# Patient Record
Sex: MALE | Race: OTHER | NOT HISPANIC OR LATINO | URBAN - METROPOLITAN AREA
[De-identification: names, ages, dates, MRNs, and addresses within clinical notes are randomized per-mention and may not be internally consistent; named-entity substitution may affect disease eponyms.]

---

## 2017-04-14 ENCOUNTER — EMERGENCY (EMERGENCY)
Facility: HOSPITAL | Age: 57
LOS: 1 days | Discharge: HOME | End: 2017-04-14
Attending: EMERGENCY MEDICINE | Admitting: EMERGENCY MEDICINE
Payer: MEDICAID

## 2017-04-14 VITALS
SYSTOLIC BLOOD PRESSURE: 152 MMHG | OXYGEN SATURATION: 97 % | DIASTOLIC BLOOD PRESSURE: 95 MMHG | TEMPERATURE: 98 F | HEART RATE: 77 BPM | RESPIRATION RATE: 20 BRPM

## 2017-04-14 VITALS
WEIGHT: 220.02 LBS | TEMPERATURE: 98 F | HEIGHT: 71 IN | OXYGEN SATURATION: 98 % | SYSTOLIC BLOOD PRESSURE: 154 MMHG | DIASTOLIC BLOOD PRESSURE: 102 MMHG | RESPIRATION RATE: 24 BRPM | HEART RATE: 70 BPM

## 2017-04-14 DIAGNOSIS — J45.901 UNSPECIFIED ASTHMA WITH (ACUTE) EXACERBATION: ICD-10-CM

## 2017-04-14 DIAGNOSIS — R05 COUGH: ICD-10-CM

## 2017-04-14 DIAGNOSIS — R06.02 SHORTNESS OF BREATH: ICD-10-CM

## 2017-04-14 DIAGNOSIS — R06.00 DYSPNEA, UNSPECIFIED: ICD-10-CM

## 2017-04-14 PROCEDURE — 99284 EMERGENCY DEPT VISIT MOD MDM: CPT

## 2017-04-14 PROCEDURE — 94640 AIRWAY INHALATION TREATMENT: CPT

## 2017-04-14 PROCEDURE — 99284 EMERGENCY DEPT VISIT MOD MDM: CPT | Mod: 25

## 2017-04-14 RX ORDER — IPRATROPIUM/ALBUTEROL SULFATE 18-103MCG
3 AEROSOL WITH ADAPTER (GRAM) INHALATION
Qty: 0 | Refills: 0 | Status: COMPLETED | OUTPATIENT
Start: 2017-04-14 | End: 2017-04-14

## 2017-04-14 RX ORDER — ALBUTEROL 90 UG/1
2 AEROSOL, METERED ORAL
Qty: 1 | Refills: 0 | OUTPATIENT
Start: 2017-04-14 | End: 2017-05-14

## 2017-04-14 RX ADMIN — Medication 3 MILLILITER(S): at 10:00

## 2017-04-14 RX ADMIN — Medication 3 MILLILITER(S): at 10:15

## 2017-04-14 RX ADMIN — Medication 3 MILLILITER(S): at 09:49

## 2017-04-14 RX ADMIN — Medication 60 MILLIGRAM(S): at 09:40

## 2017-04-14 NOTE — ED PROVIDER NOTE - ATTENDING CONTRIBUTION TO CARE
57 y/o M pmhx asthma presenting with asthma exacerbation, ran out of ventolin inhaler which was not helpful last few days; recent abx for chest congestion. no fevers or chills; nontoxic appearing. PE remarkable for significant diffuse wheeze. Will provide steroids, nebs and reassess.

## 2017-04-14 NOTE — ED PROVIDER NOTE - PHYSICAL EXAMINATION
Well Appearing, Nontoxic, NAD; Symm Facies, PERRL, EOMI, MMM, posterior pharynx clear; No JVD/Bruits or stridor;  RRR w/o m/g/r;   LUNGS with significant wheeze, inspiratory and expiratory diffusely. No rhonchi or rales;  Abd soft, nt/nd, +bs, no guarding/rebound, no RUQ tender;  No CVAT;  No edema/calf tender;  No rash, pallor;  Neuro intact, AOX3, CN II-XII intact, Normal speech, normal strength/sensation/gait

## 2017-04-14 NOTE — ED PROVIDER NOTE - OBJECTIVE STATEMENT
57 y/o M pmhx asthma presenting with asthma exacerbation worsening over last few weeks. Pt reports that he usually uses his Ventolin inhaler 2-3x per day; however, he had 'bad chest congestion for which he was prescribed augmentin' x2 weeks ago, and since that time he feels his asthma has gotten significantly worse. Pt reports he was using his Ventolin a great deal more, and ran out; was not helpful to him the last 4-5 days. Reports dry cough now associated with asthma, and noting himself wheezing significantly more. Denies any fevers, chills, productive cough, nausea, vomiting, recent travel, chest pain, lower extremity edema.

## 2017-04-14 NOTE — ED PROVIDER NOTE - NS ED ROS FT
Constitutional: No fever or chills  Eyes: No visual changes, eye pain or redness  HEENT: No throat pain, ear pain, nasal pain. No nose bleeding.  CV: No chest pain or lower extremity edema  Resp: +SOB and cough, dry  GI: No abd pain. No nausea or vomiting. No diarrhea. No constipation.   : No dysuria, hematuria.   MSK: No musculoskeletal pain  Skin: No rash  Neuro: No headache. No numbness or tingling. No weakness.

## 2017-05-06 ENCOUNTER — EMERGENCY (EMERGENCY)
Facility: HOSPITAL | Age: 57
LOS: 1 days | Discharge: HOME | End: 2017-05-06
Attending: EMERGENCY MEDICINE | Admitting: EMERGENCY MEDICINE
Payer: MEDICAID

## 2017-05-06 VITALS
DIASTOLIC BLOOD PRESSURE: 72 MMHG | HEART RATE: 82 BPM | TEMPERATURE: 98 F | SYSTOLIC BLOOD PRESSURE: 113 MMHG | OXYGEN SATURATION: 97 % | RESPIRATION RATE: 18 BRPM

## 2017-05-06 VITALS
RESPIRATION RATE: 20 BRPM | TEMPERATURE: 98 F | OXYGEN SATURATION: 96 % | SYSTOLIC BLOOD PRESSURE: 134 MMHG | HEART RATE: 78 BPM | DIASTOLIC BLOOD PRESSURE: 90 MMHG

## 2017-05-06 DIAGNOSIS — J45.901 UNSPECIFIED ASTHMA WITH (ACUTE) EXACERBATION: ICD-10-CM

## 2017-05-06 DIAGNOSIS — R06.02 SHORTNESS OF BREATH: ICD-10-CM

## 2017-05-06 DIAGNOSIS — Z87.891 PERSONAL HISTORY OF NICOTINE DEPENDENCE: ICD-10-CM

## 2017-05-06 DIAGNOSIS — E78.5 HYPERLIPIDEMIA, UNSPECIFIED: ICD-10-CM

## 2017-05-06 LAB
ALBUMIN SERPL ELPH-MCNC: 4.1 G/DL — SIGNIFICANT CHANGE UP (ref 3.3–5)
ALP SERPL-CCNC: 53 U/L — SIGNIFICANT CHANGE UP (ref 40–120)
ALT FLD-CCNC: 24 U/L RC — SIGNIFICANT CHANGE UP (ref 10–45)
ANION GAP SERPL CALC-SCNC: 11 MMOL/L — SIGNIFICANT CHANGE UP (ref 5–17)
AST SERPL-CCNC: 17 U/L — SIGNIFICANT CHANGE UP (ref 10–40)
BASOPHILS # BLD AUTO: 0 K/UL — SIGNIFICANT CHANGE UP (ref 0–0.2)
BASOPHILS NFR BLD AUTO: 0.5 % — SIGNIFICANT CHANGE UP (ref 0–2)
BILIRUB SERPL-MCNC: 1.5 MG/DL — HIGH (ref 0.2–1.2)
BUN SERPL-MCNC: 12 MG/DL — SIGNIFICANT CHANGE UP (ref 7–23)
CALCIUM SERPL-MCNC: 10.3 MG/DL — SIGNIFICANT CHANGE UP (ref 8.4–10.5)
CHLORIDE SERPL-SCNC: 106 MMOL/L — SIGNIFICANT CHANGE UP (ref 96–108)
CK MB CFR SERPL CALC: 1 NG/ML — SIGNIFICANT CHANGE UP (ref 0–6.7)
CK SERPL-CCNC: 61 U/L — SIGNIFICANT CHANGE UP (ref 30–200)
CO2 SERPL-SCNC: 24 MMOL/L — SIGNIFICANT CHANGE UP (ref 22–31)
CREAT SERPL-MCNC: 0.84 MG/DL — SIGNIFICANT CHANGE UP (ref 0.5–1.3)
EOSINOPHIL # BLD AUTO: 0.5 K/UL — SIGNIFICANT CHANGE UP (ref 0–0.5)
EOSINOPHIL NFR BLD AUTO: 8.7 % — HIGH (ref 0–6)
GAS PNL BLDV: SIGNIFICANT CHANGE UP
GLUCOSE SERPL-MCNC: 123 MG/DL — HIGH (ref 70–99)
HCT VFR BLD CALC: 43.6 % — SIGNIFICANT CHANGE UP (ref 39–50)
HGB BLD-MCNC: 15.3 G/DL — SIGNIFICANT CHANGE UP (ref 13–17)
LYMPHOCYTES # BLD AUTO: 2 K/UL — SIGNIFICANT CHANGE UP (ref 1–3.3)
LYMPHOCYTES # BLD AUTO: 35 % — SIGNIFICANT CHANGE UP (ref 13–44)
MCHC RBC-ENTMCNC: 31.2 PG — SIGNIFICANT CHANGE UP (ref 27–34)
MCHC RBC-ENTMCNC: 35.1 GM/DL — SIGNIFICANT CHANGE UP (ref 32–36)
MCV RBC AUTO: 88.9 FL — SIGNIFICANT CHANGE UP (ref 80–100)
MONOCYTES # BLD AUTO: 0.3 K/UL — SIGNIFICANT CHANGE UP (ref 0–0.9)
MONOCYTES NFR BLD AUTO: 5.8 % — SIGNIFICANT CHANGE UP (ref 2–14)
NEUTROPHILS # BLD AUTO: 2.8 K/UL — SIGNIFICANT CHANGE UP (ref 1.8–7.4)
NEUTROPHILS NFR BLD AUTO: 49.9 % — SIGNIFICANT CHANGE UP (ref 43–77)
NT-PROBNP SERPL-SCNC: 54 PG/ML — SIGNIFICANT CHANGE UP (ref 0–300)
PLATELET # BLD AUTO: 232 K/UL — SIGNIFICANT CHANGE UP (ref 150–400)
POTASSIUM SERPL-MCNC: 4 MMOL/L — SIGNIFICANT CHANGE UP (ref 3.5–5.3)
POTASSIUM SERPL-SCNC: 4 MMOL/L — SIGNIFICANT CHANGE UP (ref 3.5–5.3)
PROT SERPL-MCNC: 7.1 G/DL — SIGNIFICANT CHANGE UP (ref 6–8.3)
RBC # BLD: 4.9 M/UL — SIGNIFICANT CHANGE UP (ref 4.2–5.8)
RBC # FLD: 12.1 % — SIGNIFICANT CHANGE UP (ref 10.3–14.5)
SODIUM SERPL-SCNC: 141 MMOL/L — SIGNIFICANT CHANGE UP (ref 135–145)
TROPONIN T SERPL-MCNC: <0.01 NG/ML — SIGNIFICANT CHANGE UP (ref 0–0.06)
WBC # BLD: 5.6 K/UL — SIGNIFICANT CHANGE UP (ref 3.8–10.5)
WBC # FLD AUTO: 5.6 K/UL — SIGNIFICANT CHANGE UP (ref 3.8–10.5)

## 2017-05-06 PROCEDURE — 82553 CREATINE MB FRACTION: CPT

## 2017-05-06 PROCEDURE — 82330 ASSAY OF CALCIUM: CPT

## 2017-05-06 PROCEDURE — 71045 X-RAY EXAM CHEST 1 VIEW: CPT

## 2017-05-06 PROCEDURE — 84295 ASSAY OF SERUM SODIUM: CPT

## 2017-05-06 PROCEDURE — 93010 ELECTROCARDIOGRAM REPORT: CPT

## 2017-05-06 PROCEDURE — 83880 ASSAY OF NATRIURETIC PEPTIDE: CPT

## 2017-05-06 PROCEDURE — 82803 BLOOD GASES ANY COMBINATION: CPT

## 2017-05-06 PROCEDURE — 84132 ASSAY OF SERUM POTASSIUM: CPT

## 2017-05-06 PROCEDURE — 71010: CPT | Mod: 26

## 2017-05-06 PROCEDURE — 83605 ASSAY OF LACTIC ACID: CPT

## 2017-05-06 PROCEDURE — 93005 ELECTROCARDIOGRAM TRACING: CPT

## 2017-05-06 PROCEDURE — 85014 HEMATOCRIT: CPT

## 2017-05-06 PROCEDURE — 99285 EMERGENCY DEPT VISIT HI MDM: CPT | Mod: 25

## 2017-05-06 PROCEDURE — 80053 COMPREHEN METABOLIC PANEL: CPT

## 2017-05-06 PROCEDURE — 85027 COMPLETE CBC AUTOMATED: CPT

## 2017-05-06 PROCEDURE — 99284 EMERGENCY DEPT VISIT MOD MDM: CPT | Mod: 25

## 2017-05-06 PROCEDURE — 82550 ASSAY OF CK (CPK): CPT

## 2017-05-06 PROCEDURE — 96374 THER/PROPH/DIAG INJ IV PUSH: CPT

## 2017-05-06 PROCEDURE — 82435 ASSAY OF BLOOD CHLORIDE: CPT

## 2017-05-06 PROCEDURE — 82947 ASSAY GLUCOSE BLOOD QUANT: CPT

## 2017-05-06 PROCEDURE — 94640 AIRWAY INHALATION TREATMENT: CPT

## 2017-05-06 PROCEDURE — 84484 ASSAY OF TROPONIN QUANT: CPT

## 2017-05-06 RX ORDER — ALBUTEROL 90 UG/1
0 AEROSOL, METERED ORAL
Qty: 0 | Refills: 0 | COMMUNITY

## 2017-05-06 RX ORDER — IPRATROPIUM/ALBUTEROL SULFATE 18-103MCG
3 AEROSOL WITH ADAPTER (GRAM) INHALATION
Qty: 0 | Refills: 0 | Status: COMPLETED | OUTPATIENT
Start: 2017-05-06 | End: 2017-05-06

## 2017-05-06 RX ORDER — ALBUTEROL 90 UG/1
2 AEROSOL, METERED ORAL
Qty: 1 | Refills: 0 | OUTPATIENT
Start: 2017-05-06 | End: 2017-06-05

## 2017-05-06 RX ORDER — ALBUTEROL 90 UG/1
3 AEROSOL, METERED ORAL
Qty: 30 | Refills: 0 | OUTPATIENT
Start: 2017-05-06 | End: 2017-06-05

## 2017-05-06 RX ORDER — IPRATROPIUM/ALBUTEROL SULFATE 18-103MCG
3 AEROSOL WITH ADAPTER (GRAM) INHALATION EVERY 6 HOURS
Qty: 0 | Refills: 0 | Status: DISCONTINUED | OUTPATIENT
Start: 2017-05-06 | End: 2017-05-10

## 2017-05-06 RX ADMIN — Medication 3 MILLILITER(S): at 12:36

## 2017-05-06 RX ADMIN — Medication 125 MILLIGRAM(S): at 12:04

## 2017-05-06 RX ADMIN — Medication 3 MILLILITER(S): at 12:49

## 2017-05-06 RX ADMIN — Medication 3 MILLILITER(S): at 12:04

## 2017-05-06 NOTE — ED PROVIDER NOTE - OBJECTIVE STATEMENT
56M with history of asthma/COPD, nephrolithiasis and BPH, presenting with 3 days or persistent shortness of breath, nonproductive cough and wheezing. Patient says that he recently lost insurance coverage, and ran out of Ventolin 3 days ago. Since he's had the above symptoms. Patient currently lives with his sister, who smokes and has a dog. He has been having asthma exacerbations frequently due to the pollen and other environmental triggers. No fevers/chills/night sweats. No chest pain. No abdominal pain/nausea/vomiting/diarrhea. No hematuria/dysuria.

## 2017-05-06 NOTE — ED PROVIDER NOTE - ATTENDING CONTRIBUTION TO CARE
56m pmhx asthma/COPD, kidney stones, BPH p/w cough/SOB/wheeze. started ~1 wk ago, constant, mild a/w dry cough and wheeze. recently lost insurance coverage, and ran out of albuterol 3 days ago. states last exacerbation last month, d/c'd home with steroids; had never been admitted. denies fever/chills, ENT complaints, CP, nausea/vomiting, pedal edema. states sx c/w past attacks. former smoker. on PE, VSS, in mild distress, mild exp wheeze, RRR, abdo soft, non-TTP, no pedal edema. will give duonebs x3, soluemdrol, CXR. cardiac panel, EKG

## 2017-05-06 NOTE — ED ADULT NURSE NOTE - OBJECTIVE STATEMENT
56 yr old male PMHx asthma, BPH, and HLD presents here today with c/o wheezing and sob x 1 week. patient states he ran out of his albuterol and since his insurance is  he was not able to refill it. Also the change of season makes his asthma worse. States he usually takes his inhaler 7-8 times a day with relief. He is not on any other medications to control his asthma. reports dry cough, sob, and intermittent chest discomfort. Denies any fevers, chills, travel, leg pain, leg swelling, dizziness, nasal congestion, throat pain, or sick contacts. Lungs diffuse wheezing in all lung fields. safety and comfort maintained.

## 2017-05-31 ENCOUNTER — INPATIENT (INPATIENT)
Facility: HOSPITAL | Age: 57
LOS: 2 days | Discharge: HOME | DRG: 202 | End: 2017-06-03
Attending: INTERNAL MEDICINE | Admitting: INTERNAL MEDICINE
Payer: MEDICAID

## 2017-05-31 VITALS
SYSTOLIC BLOOD PRESSURE: 147 MMHG | OXYGEN SATURATION: 94 % | HEART RATE: 203 BPM | TEMPERATURE: 100 F | RESPIRATION RATE: 20 BRPM | DIASTOLIC BLOOD PRESSURE: 81 MMHG

## 2017-05-31 LAB — GAS PNL BLDV: SIGNIFICANT CHANGE UP

## 2017-05-31 PROCEDURE — 99291 CRITICAL CARE FIRST HOUR: CPT

## 2017-05-31 RX ORDER — CEFTRIAXONE 500 MG/1
1 INJECTION, POWDER, FOR SOLUTION INTRAMUSCULAR; INTRAVENOUS ONCE
Qty: 0 | Refills: 0 | Status: COMPLETED | OUTPATIENT
Start: 2017-05-31 | End: 2017-05-31

## 2017-05-31 RX ORDER — IPRATROPIUM/ALBUTEROL SULFATE 18-103MCG
3 AEROSOL WITH ADAPTER (GRAM) INHALATION ONCE
Qty: 0 | Refills: 0 | Status: COMPLETED | OUTPATIENT
Start: 2017-05-31 | End: 2017-05-31

## 2017-05-31 RX ORDER — SODIUM CHLORIDE 9 MG/ML
1000 INJECTION INTRAMUSCULAR; INTRAVENOUS; SUBCUTANEOUS ONCE
Qty: 0 | Refills: 0 | Status: COMPLETED | OUTPATIENT
Start: 2017-05-31 | End: 2017-05-31

## 2017-05-31 RX ORDER — ACETAMINOPHEN 500 MG
650 TABLET ORAL ONCE
Qty: 0 | Refills: 0 | Status: COMPLETED | OUTPATIENT
Start: 2017-05-31 | End: 2017-06-01

## 2017-05-31 RX ORDER — MAGNESIUM SULFATE 500 MG/ML
2 VIAL (ML) INJECTION ONCE
Qty: 0 | Refills: 0 | Status: COMPLETED | OUTPATIENT
Start: 2017-05-31 | End: 2017-05-31

## 2017-05-31 RX ORDER — AZITHROMYCIN 500 MG/1
500 TABLET, FILM COATED ORAL ONCE
Qty: 0 | Refills: 0 | Status: COMPLETED | OUTPATIENT
Start: 2017-05-31 | End: 2017-05-31

## 2017-05-31 RX ADMIN — Medication 3 MILLILITER(S): at 23:32

## 2017-05-31 RX ADMIN — SODIUM CHLORIDE 1000 MILLILITER(S): 9 INJECTION INTRAMUSCULAR; INTRAVENOUS; SUBCUTANEOUS at 23:49

## 2017-05-31 RX ADMIN — Medication 3 MILLILITER(S): at 23:49

## 2017-05-31 RX ADMIN — Medication 3 MILLILITER(S): at 23:40

## 2017-05-31 RX ADMIN — Medication 125 MILLIGRAM(S): at 23:49

## 2017-05-31 NOTE — ED PROVIDER NOTE - ATTENDING CONTRIBUTION TO CARE
I, Dr. Hal Sanabria, interviewed the patient and performed a physical exam and spoke to the resident about the plan of care for this patient.  Pt with cough, wheezing, chills, dyspnea, not improved after multiple nebs at home.  Appears in distress, wheezing, retractions, dry mm.

## 2017-05-31 NOTE — ED PROVIDER NOTE - CARE PLAN
Principal Discharge DX:	Asthma exacerbation  Secondary Diagnosis:	Hypoxemia requiring supplemental oxygen

## 2017-05-31 NOTE — ED PROVIDER NOTE - PHYSICAL EXAMINATION
Constitutional: Alert, in mod distress 2/2 dyspnea  Eyes: PERRLA EOMI  HEENT: MMM, oropharynx non-erythematous, no exudate  Head: Normocephalic atraumatic  Cardiac: RRR, S1/S2, no MRG  Resp: Increased WOB with accessory muscle use, diffuse rhonchi bilat  GI: Abd s/nt/nd, no rebound or guard  Neuro: CN2-12 intact  Skin: No rashes

## 2017-05-31 NOTE — ED PROVIDER NOTE - NS ED ROS FT
Constitutional: No fever or chills  Eyes: No visual changes  HEENT: Nasal congestion, sore throat  CV: No chest pain  Resp: Cough, sob, wheeze  GI: No abd pain, nause or vomiting  : No dysuria  MSK: No musculoskeletal pain  Skin: No rash  Neuro: No headache

## 2017-05-31 NOTE — ED PROVIDER NOTE - MEDICAL DECISION MAKING DETAILS
Dr. Sanabria Note: asthma exac with fever and hypoxemia, will req admission for failure of outpt for fluids, nebs, steroids, and possibly antibx

## 2017-05-31 NOTE — ED PROVIDER NOTE - OBJECTIVE STATEMENT
56M with pmh of asthma presents with cough, wheeze, sob, x a few days, worsening today. initially with sore throat, nasal congestion, cough productive of clear sputum. today with worsening wheeze. used nebulizer x 2, proventil inhaler "over 50 times" without sig improvement. subjective fever, chills. no n/v/d. no cp. no abd pain. no known sick contacts, no recent travel.

## 2017-06-01 DIAGNOSIS — J45.901 UNSPECIFIED ASTHMA WITH (ACUTE) EXACERBATION: ICD-10-CM

## 2017-06-01 LAB
ALBUMIN SERPL ELPH-MCNC: 4.8 G/DL — SIGNIFICANT CHANGE UP (ref 3.3–5)
ALP SERPL-CCNC: 63 U/L — SIGNIFICANT CHANGE UP (ref 40–120)
ALT FLD-CCNC: 18 U/L RC — SIGNIFICANT CHANGE UP (ref 10–45)
ANION GAP SERPL CALC-SCNC: 18 MMOL/L — HIGH (ref 5–17)
AST SERPL-CCNC: 17 U/L — SIGNIFICANT CHANGE UP (ref 10–40)
BASOPHILS # BLD AUTO: 0 K/UL — SIGNIFICANT CHANGE UP (ref 0–0.2)
BASOPHILS NFR BLD AUTO: 0.5 % — SIGNIFICANT CHANGE UP (ref 0–2)
BILIRUB SERPL-MCNC: 1.9 MG/DL — HIGH (ref 0.2–1.2)
BUN SERPL-MCNC: 6 MG/DL — LOW (ref 7–23)
CALCIUM SERPL-MCNC: 10.7 MG/DL — HIGH (ref 8.4–10.5)
CHLORIDE SERPL-SCNC: 101 MMOL/L — SIGNIFICANT CHANGE UP (ref 96–108)
CO2 SERPL-SCNC: 23 MMOL/L — SIGNIFICANT CHANGE UP (ref 22–31)
CREAT SERPL-MCNC: 0.99 MG/DL — SIGNIFICANT CHANGE UP (ref 0.5–1.3)
EOSINOPHIL # BLD AUTO: 0.3 K/UL — SIGNIFICANT CHANGE UP (ref 0–0.5)
EOSINOPHIL NFR BLD AUTO: 3.8 % — SIGNIFICANT CHANGE UP (ref 0–6)
GLUCOSE SERPL-MCNC: 118 MG/DL — HIGH (ref 70–99)
HCT VFR BLD CALC: 47 % — SIGNIFICANT CHANGE UP (ref 39–50)
HGB BLD-MCNC: 15.7 G/DL — SIGNIFICANT CHANGE UP (ref 13–17)
IGE SERPL-ACNC: 1264 IU/ML — HIGH (ref 0–100)
LYMPHOCYTES # BLD AUTO: 1.1 K/UL — SIGNIFICANT CHANGE UP (ref 1–3.3)
LYMPHOCYTES # BLD AUTO: 12 % — LOW (ref 13–44)
MCHC RBC-ENTMCNC: 30.3 PG — SIGNIFICANT CHANGE UP (ref 27–34)
MCHC RBC-ENTMCNC: 33.3 GM/DL — SIGNIFICANT CHANGE UP (ref 32–36)
MCV RBC AUTO: 90.9 FL — SIGNIFICANT CHANGE UP (ref 80–100)
MONOCYTES # BLD AUTO: 0.6 K/UL — SIGNIFICANT CHANGE UP (ref 0–0.9)
MONOCYTES NFR BLD AUTO: 7 % — SIGNIFICANT CHANGE UP (ref 2–14)
NEUTROPHILS # BLD AUTO: 6.9 K/UL — SIGNIFICANT CHANGE UP (ref 1.8–7.4)
NEUTROPHILS NFR BLD AUTO: 76.7 % — SIGNIFICANT CHANGE UP (ref 43–77)
PLATELET # BLD AUTO: 253 K/UL — SIGNIFICANT CHANGE UP (ref 150–400)
POTASSIUM SERPL-MCNC: 3.5 MMOL/L — SIGNIFICANT CHANGE UP (ref 3.5–5.3)
POTASSIUM SERPL-SCNC: 3.5 MMOL/L — SIGNIFICANT CHANGE UP (ref 3.5–5.3)
PROCALCITONIN SERPL-MCNC: <0.05 NG/ML — SIGNIFICANT CHANGE UP (ref 0–0.04)
PROT SERPL-MCNC: 8.2 G/DL — SIGNIFICANT CHANGE UP (ref 6–8.3)
RAPID RVP RESULT: DETECTED
RBC # BLD: 5.17 M/UL — SIGNIFICANT CHANGE UP (ref 4.2–5.8)
RBC # FLD: 12.1 % — SIGNIFICANT CHANGE UP (ref 10.3–14.5)
RV+EV RNA SPEC QL NAA+PROBE: DETECTED
SODIUM SERPL-SCNC: 142 MMOL/L — SIGNIFICANT CHANGE UP (ref 135–145)
WBC # BLD: 9 K/UL — SIGNIFICANT CHANGE UP (ref 3.8–10.5)
WBC # FLD AUTO: 9 K/UL — SIGNIFICANT CHANGE UP (ref 3.8–10.5)

## 2017-06-01 PROCEDURE — 99223 1ST HOSP IP/OBS HIGH 75: CPT

## 2017-06-01 PROCEDURE — 71020: CPT | Mod: 26

## 2017-06-01 PROCEDURE — 71250 CT THORAX DX C-: CPT | Mod: 26

## 2017-06-01 RX ORDER — CEFTRIAXONE 500 MG/1
INJECTION, POWDER, FOR SOLUTION INTRAMUSCULAR; INTRAVENOUS
Qty: 0 | Refills: 0 | Status: DISCONTINUED | OUTPATIENT
Start: 2017-06-01 | End: 2017-06-01

## 2017-06-01 RX ORDER — BUDESONIDE, MICRONIZED 100 %
0.5 POWDER (GRAM) MISCELLANEOUS EVERY 12 HOURS
Qty: 0 | Refills: 0 | Status: DISCONTINUED | OUTPATIENT
Start: 2017-06-01 | End: 2017-06-02

## 2017-06-01 RX ORDER — AZITHROMYCIN 500 MG/1
250 TABLET, FILM COATED ORAL DAILY
Qty: 0 | Refills: 0 | Status: DISCONTINUED | OUTPATIENT
Start: 2017-06-02 | End: 2017-06-03

## 2017-06-01 RX ORDER — IPRATROPIUM/ALBUTEROL SULFATE 18-103MCG
3 AEROSOL WITH ADAPTER (GRAM) INHALATION EVERY 4 HOURS
Qty: 0 | Refills: 0 | Status: DISCONTINUED | OUTPATIENT
Start: 2017-06-01 | End: 2017-06-03

## 2017-06-01 RX ORDER — ALBUTEROL 90 UG/1
2.5 AEROSOL, METERED ORAL ONCE
Qty: 0 | Refills: 0 | Status: COMPLETED | OUTPATIENT
Start: 2017-06-01 | End: 2017-06-01

## 2017-06-01 RX ORDER — HEPARIN SODIUM 5000 [USP'U]/ML
5000 INJECTION INTRAVENOUS; SUBCUTANEOUS EVERY 8 HOURS
Qty: 0 | Refills: 0 | Status: DISCONTINUED | OUTPATIENT
Start: 2017-06-01 | End: 2017-06-03

## 2017-06-01 RX ORDER — AZITHROMYCIN 500 MG/1
TABLET, FILM COATED ORAL
Qty: 0 | Refills: 0 | Status: DISCONTINUED | OUTPATIENT
Start: 2017-06-01 | End: 2017-06-01

## 2017-06-01 RX ORDER — AZITHROMYCIN 500 MG/1
500 TABLET, FILM COATED ORAL ONCE
Qty: 0 | Refills: 0 | Status: COMPLETED | OUTPATIENT
Start: 2017-06-01 | End: 2017-06-01

## 2017-06-01 RX ORDER — CEFTRIAXONE 500 MG/1
1 INJECTION, POWDER, FOR SOLUTION INTRAMUSCULAR; INTRAVENOUS ONCE
Qty: 0 | Refills: 0 | Status: COMPLETED | OUTPATIENT
Start: 2017-06-01 | End: 2017-06-01

## 2017-06-01 RX ADMIN — Medication 20 MILLIGRAM(S): at 11:37

## 2017-06-01 RX ADMIN — SODIUM CHLORIDE 1000 MILLILITER(S): 9 INJECTION INTRAMUSCULAR; INTRAVENOUS; SUBCUTANEOUS at 00:13

## 2017-06-01 RX ADMIN — Medication 100 GRAM(S): at 00:01

## 2017-06-01 RX ADMIN — Medication 3 MILLILITER(S): at 18:52

## 2017-06-01 RX ADMIN — HEPARIN SODIUM 5000 UNIT(S): 5000 INJECTION INTRAVENOUS; SUBCUTANEOUS at 15:06

## 2017-06-01 RX ADMIN — Medication 3 MILLILITER(S): at 15:02

## 2017-06-01 RX ADMIN — CEFTRIAXONE 100 GRAM(S): 500 INJECTION, POWDER, FOR SOLUTION INTRAMUSCULAR; INTRAVENOUS at 00:11

## 2017-06-01 RX ADMIN — Medication 3 MILLILITER(S): at 11:37

## 2017-06-01 RX ADMIN — Medication 650 MILLIGRAM(S): at 00:01

## 2017-06-01 RX ADMIN — AZITHROMYCIN 250 MILLIGRAM(S): 500 TABLET, FILM COATED ORAL at 01:02

## 2017-06-01 RX ADMIN — HEPARIN SODIUM 5000 UNIT(S): 5000 INJECTION INTRAVENOUS; SUBCUTANEOUS at 21:14

## 2017-06-01 RX ADMIN — Medication 20 MILLIGRAM(S): at 21:14

## 2017-06-01 RX ADMIN — Medication 0.5 MILLIGRAM(S): at 19:52

## 2017-06-01 RX ADMIN — ALBUTEROL 2.5 MILLIGRAM(S): 90 AEROSOL, METERED ORAL at 03:31

## 2017-06-01 RX ADMIN — Medication 3 MILLILITER(S): at 21:14

## 2017-06-01 NOTE — ED ADULT NURSE NOTE - OBJECTIVE STATEMENT
Pt c/o SOB since this morning.  Pt reports feeling like he had a sore throat with cold like symptoms since the weekend, this morning around 1030 began with dry cough, SOB and chills, progressively worsening throughout the day.  Pt arrives tachycardic, tachypneic to 30, spO2 93% on RA, audible wheezing, diaphoretic, conversive without difficulty, ambulatory, abd soft/nt/nd, skin intact, denies cp, ha, nvd, numbness/tingling, sick contacts, recent travel.  CCM in place, EKG performed and handed to attending, duoneb treatment initiated.

## 2017-06-01 NOTE — H&P ADULT. - ASSESSMENT
pt with above history p/w asthma exacerbation    - steroids, nebs, oxygen supplementation, pulm eval    Chest pressure - likely pleuritic given h/o smoking will check echo , cards eval, chest pain, pt is chest pain free at present

## 2017-06-01 NOTE — H&P ADULT. - HISTORY OF PRESENT ILLNESS
· HPI Objective Statement: 56M with pmh of asthma presents with cough, wheeze, sob, x a few days, worsening today. initially with sore throat, nasal congestion, cough productive of clear sputum. today with worsening wheeze. used nebulizer x 2, proventil inhaler "over 50 times" without sig improvement. subjective fever, chills. no n/v/d. no cp. no abd pain. no known sick contacts, no recent travel. 56M with pmh of asthma presents with cough, wheeze, sob, x a few days, worsening today. initially with sore throat, nasal congestion, cough productive of clear sputum. today with worsening wheeze. used nebulizer x 2, proventil inhaler  without sig improvement. subjective fever, chills. no n/v/d. no cp. no abd pain. no known sick contacts, no recent travel.

## 2017-06-02 LAB
ANION GAP SERPL CALC-SCNC: 16 MMOL/L — SIGNIFICANT CHANGE UP (ref 5–17)
BUN SERPL-MCNC: 16 MG/DL — SIGNIFICANT CHANGE UP (ref 7–23)
CALCIUM SERPL-MCNC: 10.3 MG/DL — SIGNIFICANT CHANGE UP (ref 8.4–10.5)
CHLORIDE SERPL-SCNC: 105 MMOL/L — SIGNIFICANT CHANGE UP (ref 96–108)
CO2 SERPL-SCNC: 18 MMOL/L — LOW (ref 22–31)
CREAT SERPL-MCNC: 0.91 MG/DL — SIGNIFICANT CHANGE UP (ref 0.5–1.3)
GLUCOSE SERPL-MCNC: 153 MG/DL — HIGH (ref 70–99)
HCT VFR BLD CALC: 44.3 % — SIGNIFICANT CHANGE UP (ref 39–50)
HGB BLD-MCNC: 15 G/DL — SIGNIFICANT CHANGE UP (ref 13–17)
MCHC RBC-ENTMCNC: 31.1 PG — SIGNIFICANT CHANGE UP (ref 27–34)
MCHC RBC-ENTMCNC: 33.7 GM/DL — SIGNIFICANT CHANGE UP (ref 32–36)
MCV RBC AUTO: 92.2 FL — SIGNIFICANT CHANGE UP (ref 80–100)
PLATELET # BLD AUTO: 263 K/UL — SIGNIFICANT CHANGE UP (ref 150–400)
POTASSIUM SERPL-MCNC: 4.5 MMOL/L — SIGNIFICANT CHANGE UP (ref 3.5–5.3)
POTASSIUM SERPL-SCNC: 4.5 MMOL/L — SIGNIFICANT CHANGE UP (ref 3.5–5.3)
RBC # BLD: 4.81 M/UL — SIGNIFICANT CHANGE UP (ref 4.2–5.8)
RBC # FLD: 12.3 % — SIGNIFICANT CHANGE UP (ref 10.3–14.5)
SODIUM SERPL-SCNC: 139 MMOL/L — SIGNIFICANT CHANGE UP (ref 135–145)
WBC # BLD: 11.7 K/UL — HIGH (ref 3.8–10.5)
WBC # FLD AUTO: 11.7 K/UL — HIGH (ref 3.8–10.5)

## 2017-06-02 PROCEDURE — 99232 SBSQ HOSP IP/OBS MODERATE 35: CPT

## 2017-06-02 RX ORDER — BUDESONIDE AND FORMOTEROL FUMARATE DIHYDRATE 160; 4.5 UG/1; UG/1
2 AEROSOL RESPIRATORY (INHALATION)
Qty: 0 | Refills: 0 | Status: DISCONTINUED | OUTPATIENT
Start: 2017-06-02 | End: 2017-06-03

## 2017-06-02 RX ADMIN — HEPARIN SODIUM 5000 UNIT(S): 5000 INJECTION INTRAVENOUS; SUBCUTANEOUS at 05:07

## 2017-06-02 RX ADMIN — Medication 20 MILLIGRAM(S): at 05:06

## 2017-06-02 RX ADMIN — Medication 0.5 MILLIGRAM(S): at 05:21

## 2017-06-02 RX ADMIN — Medication 3 MILLILITER(S): at 09:44

## 2017-06-02 RX ADMIN — BUDESONIDE AND FORMOTEROL FUMARATE DIHYDRATE 2 PUFF(S): 160; 4.5 AEROSOL RESPIRATORY (INHALATION) at 17:45

## 2017-06-02 RX ADMIN — Medication 20 MILLIGRAM(S): at 13:47

## 2017-06-02 RX ADMIN — AZITHROMYCIN 250 MILLIGRAM(S): 500 TABLET, FILM COATED ORAL at 13:48

## 2017-06-02 RX ADMIN — Medication 3 MILLILITER(S): at 17:45

## 2017-06-02 RX ADMIN — HEPARIN SODIUM 5000 UNIT(S): 5000 INJECTION INTRAVENOUS; SUBCUTANEOUS at 13:47

## 2017-06-02 RX ADMIN — Medication 3 MILLILITER(S): at 21:51

## 2017-06-02 RX ADMIN — Medication 3 MILLILITER(S): at 13:48

## 2017-06-02 RX ADMIN — HEPARIN SODIUM 5000 UNIT(S): 5000 INJECTION INTRAVENOUS; SUBCUTANEOUS at 21:51

## 2017-06-02 RX ADMIN — Medication 3 MILLILITER(S): at 05:07

## 2017-06-02 RX ADMIN — Medication 3 MILLILITER(S): at 01:09

## 2017-06-03 ENCOUNTER — TRANSCRIPTION ENCOUNTER (OUTPATIENT)
Age: 57
End: 2017-06-03

## 2017-06-03 VITALS
SYSTOLIC BLOOD PRESSURE: 121 MMHG | HEART RATE: 82 BPM | OXYGEN SATURATION: 94 % | TEMPERATURE: 98 F | DIASTOLIC BLOOD PRESSURE: 81 MMHG | RESPIRATION RATE: 18 BRPM

## 2017-06-03 LAB
HCT VFR BLD CALC: 41.6 % — SIGNIFICANT CHANGE UP (ref 39–50)
HGB BLD-MCNC: 14.2 G/DL — SIGNIFICANT CHANGE UP (ref 13–17)
MCHC RBC-ENTMCNC: 31.6 PG — SIGNIFICANT CHANGE UP (ref 27–34)
MCHC RBC-ENTMCNC: 34 GM/DL — SIGNIFICANT CHANGE UP (ref 32–36)
MCV RBC AUTO: 92.7 FL — SIGNIFICANT CHANGE UP (ref 80–100)
PLATELET # BLD AUTO: 222 K/UL — SIGNIFICANT CHANGE UP (ref 150–400)
RBC # BLD: 4.48 M/UL — SIGNIFICANT CHANGE UP (ref 4.2–5.8)
RBC # FLD: 12.2 % — SIGNIFICANT CHANGE UP (ref 10.3–14.5)
WBC # BLD: 10.5 K/UL — SIGNIFICANT CHANGE UP (ref 3.8–10.5)
WBC # FLD AUTO: 10.5 K/UL — SIGNIFICANT CHANGE UP (ref 3.8–10.5)

## 2017-06-03 RX ORDER — POLYETHYLENE GLYCOL 3350 17 G/17G
17 POWDER, FOR SOLUTION ORAL DAILY
Qty: 0 | Refills: 0 | Status: DISCONTINUED | OUTPATIENT
Start: 2017-06-03 | End: 2017-06-03

## 2017-06-03 RX ORDER — AZITHROMYCIN 500 MG/1
1 TABLET, FILM COATED ORAL
Qty: 6 | Refills: 0 | OUTPATIENT
Start: 2017-06-03 | End: 2017-06-09

## 2017-06-03 RX ORDER — BUDESONIDE AND FORMOTEROL FUMARATE DIHYDRATE 160; 4.5 UG/1; UG/1
2 AEROSOL RESPIRATORY (INHALATION)
Qty: 1 | Refills: 0 | OUTPATIENT
Start: 2017-06-03 | End: 2017-07-03

## 2017-06-03 RX ORDER — AZITHROMYCIN 500 MG/1
1 TABLET, FILM COATED ORAL
Qty: 10 | Refills: 0 | OUTPATIENT
Start: 2017-06-03 | End: 2017-06-13

## 2017-06-03 RX ORDER — DOCUSATE SODIUM 100 MG
100 CAPSULE ORAL THREE TIMES A DAY
Qty: 0 | Refills: 0 | Status: DISCONTINUED | OUTPATIENT
Start: 2017-06-03 | End: 2017-06-03

## 2017-06-03 RX ORDER — SENNA PLUS 8.6 MG/1
2 TABLET ORAL AT BEDTIME
Qty: 0 | Refills: 0 | Status: DISCONTINUED | OUTPATIENT
Start: 2017-06-03 | End: 2017-06-03

## 2017-06-03 RX ORDER — ALBUTEROL 90 UG/1
2 AEROSOL, METERED ORAL
Qty: 1 | Refills: 0 | OUTPATIENT
Start: 2017-06-03 | End: 2017-07-03

## 2017-06-03 RX ADMIN — POLYETHYLENE GLYCOL 3350 17 GRAM(S): 17 POWDER, FOR SOLUTION ORAL at 11:28

## 2017-06-03 RX ADMIN — Medication 100 MILLIGRAM(S): at 13:29

## 2017-06-03 RX ADMIN — Medication 3 MILLILITER(S): at 05:06

## 2017-06-03 RX ADMIN — Medication 40 MILLIGRAM(S): at 05:07

## 2017-06-03 RX ADMIN — BUDESONIDE AND FORMOTEROL FUMARATE DIHYDRATE 2 PUFF(S): 160; 4.5 AEROSOL RESPIRATORY (INHALATION) at 05:07

## 2017-06-03 RX ADMIN — BUDESONIDE AND FORMOTEROL FUMARATE DIHYDRATE 2 PUFF(S): 160; 4.5 AEROSOL RESPIRATORY (INHALATION) at 17:40

## 2017-06-03 RX ADMIN — AZITHROMYCIN 250 MILLIGRAM(S): 500 TABLET, FILM COATED ORAL at 09:35

## 2017-06-03 RX ADMIN — Medication 3 MILLILITER(S): at 01:41

## 2017-06-03 RX ADMIN — HEPARIN SODIUM 5000 UNIT(S): 5000 INJECTION INTRAVENOUS; SUBCUTANEOUS at 05:07

## 2017-06-03 RX ADMIN — HEPARIN SODIUM 5000 UNIT(S): 5000 INJECTION INTRAVENOUS; SUBCUTANEOUS at 13:30

## 2017-06-03 NOTE — DISCHARGE NOTE ADULT - HOSPITAL COURSE
Attending MD to complete. 56M with pmh of asthma presents with cough, wheeze, sob, x a few days, worsening today. initially with sore throat, nasal congestion, cough productive of clear sputum. today with worsening wheeze. used nebulizer x 2, proventil inhaler "over 50 times" without sig improvement. subjective fever, chills.    DX Asthma exacerbation  Chest pressure   - Enterovirus   RVP +, improved rest status with steroids/ nebs  dced after pulm clearance

## 2017-06-03 NOTE — DISCHARGE NOTE ADULT - PLAN OF CARE
Resolution od symptoms. Continue taking your medications and using your inhalers as prescribed.  Seek medical attention for any persistent shortness of breath or fever.   Follow up with Dr. Zimmer in 2 weeks. Continue taking your medications and using your inhalers as prescribed.  Follow up with Dr. Zimmer in 2 weeks. Take your medications as prescribed.  Follow up with your medical doctor to establish long term blood pressure treatment goals. Take your medications as prescribed.  Follow up with your medical doctor to establish long term  treatment goals.

## 2017-06-03 NOTE — DISCHARGE NOTE ADULT - ADDITIONAL INSTRUCTIONS
Follow up with Pulmonologist, Dr. Zimmer in 2 weeks after discharge. You will need to have a Cat Scan of your chest for evaluation. Dr. Zimmer will discuss with you, and plan for Cat Scan.   Call to schedule appointment.  You will need a Primary Care MD to follow up your Cholesterol and Benign Prostatic Hypertrophy. Please try to get one in the next 2 weeks.

## 2017-06-03 NOTE — DISCHARGE NOTE ADULT - MEDICATION SUMMARY - MEDICATIONS TO TAKE
I will START or STAY ON the medications listed below when I get home from the hospital:    predniSONE 20 mg oral tablet  -- 2 tab(s) by mouth once a day  -- Indication: For Asthma  exacerbation    tamsulosin 0.4 mg oral capsule  -- 1 cap(s) by mouth once a day  -- It is very important that you take or use this exactly as directed.  Do not skip doses or discontinue unless directed by your doctor.  May cause drowsiness.  Alcohol may intensify this effect.  Use care when operating dangerous machinery.  Some non-prescription drugs may aggravate your condition.  Read all labels carefully.  If a warning appears, check with your doctor before taking.  Swallow whole.  Do not crush.  Take with food or milk.    -- Indication: For Benign Prostatic hypertrophy    Ventolin HFA 90 mcg/inh inhalation aerosol  -- 2 puff(s) inhaled every 6 hours, As Needed for shortness odf breath/wheezing  -- For inhalation only.  It is very important that you take or use this exactly as directed.  Do not skip doses or discontinue unless directed by your doctor.  Obtain medical advice before taking any non-prescription drugs as some may affect the action of this medication.  Shake well before use.    -- Indication: For Asthma    budesonide-formoterol 160 mcg-4.5 mcg/inh inhalation aerosol  -- 2 inhaled 2 times a day  -- Indication: For Asthma     azithromycin 250 mg oral tablet  -- 1 tab(s) by mouth once a day  -- Indication: For Antibiotic

## 2017-06-03 NOTE — DISCHARGE NOTE ADULT - PATIENT PORTAL LINK FT
“You can access the [facility] Patient Portal, offered by [facility], by registering with the following website: [email or web address]”

## 2017-06-03 NOTE — DISCHARGE NOTE ADULT - CARE PLAN
Principal Discharge DX:	Asthma exacerbation  Goal:	Resolution od symptoms.  Instructions for follow-up, activity and diet:	Continue taking your medications and using your inhalers as prescribed.  Seek medical attention for any persistent shortness of breath or fever.   Follow up with Dr. Zimmer in 2 weeks.  Secondary Diagnosis:	Hypercholesterolemia  Instructions for follow-up, activity and diet:	Take your medications as prescribed.  Follow up with your medical doctor to establish long term blood pressure treatment goals.  Secondary Diagnosis:	Hypoxemia requiring supplemental oxygen  Instructions for follow-up, activity and diet:	Continue taking your medications and using your inhalers as prescribed.  Follow up with Dr. Zimmer in 2 weeks.  Secondary Diagnosis:	Enlarged prostate  Instructions for follow-up, activity and diet:	Take your medications as prescribed.  Follow up with your medical doctor to establish long term  treatment goals.

## 2017-06-03 NOTE — DISCHARGE NOTE ADULT - CARE PROVIDER_API CALL
Asif Zimmer (DO), Critical Care Medicine; Internal Medicine; Pulmonary Disease  [address] [address]  Phone: [phone]  Fax: [phone]

## 2017-06-06 LAB
CULTURE RESULTS: SIGNIFICANT CHANGE UP
CULTURE RESULTS: SIGNIFICANT CHANGE UP
SPECIMEN SOURCE: SIGNIFICANT CHANGE UP
SPECIMEN SOURCE: SIGNIFICANT CHANGE UP

## 2017-07-23 PROCEDURE — 82803 BLOOD GASES ANY COMBINATION: CPT

## 2017-07-23 PROCEDURE — 84295 ASSAY OF SERUM SODIUM: CPT

## 2017-07-23 PROCEDURE — 93005 ELECTROCARDIOGRAM TRACING: CPT

## 2017-07-23 PROCEDURE — 71250 CT THORAX DX C-: CPT

## 2017-07-23 PROCEDURE — 87798 DETECT AGENT NOS DNA AMP: CPT

## 2017-07-23 PROCEDURE — 87581 M.PNEUMON DNA AMP PROBE: CPT

## 2017-07-23 PROCEDURE — 82435 ASSAY OF BLOOD CHLORIDE: CPT

## 2017-07-23 PROCEDURE — 87486 CHLMYD PNEUM DNA AMP PROBE: CPT

## 2017-07-23 PROCEDURE — 96374 THER/PROPH/DIAG INJ IV PUSH: CPT

## 2017-07-23 PROCEDURE — 99291 CRITICAL CARE FIRST HOUR: CPT | Mod: 25

## 2017-07-23 PROCEDURE — 71046 X-RAY EXAM CHEST 2 VIEWS: CPT

## 2017-07-23 PROCEDURE — 82330 ASSAY OF CALCIUM: CPT

## 2017-07-23 PROCEDURE — 80053 COMPREHEN METABOLIC PANEL: CPT

## 2017-07-23 PROCEDURE — 85014 HEMATOCRIT: CPT

## 2017-07-23 PROCEDURE — 83605 ASSAY OF LACTIC ACID: CPT

## 2017-07-23 PROCEDURE — 82785 ASSAY OF IGE: CPT

## 2017-07-23 PROCEDURE — 87633 RESP VIRUS 12-25 TARGETS: CPT

## 2017-07-23 PROCEDURE — 80048 BASIC METABOLIC PNL TOTAL CA: CPT

## 2017-07-23 PROCEDURE — 85027 COMPLETE CBC AUTOMATED: CPT

## 2017-07-23 PROCEDURE — 94640 AIRWAY INHALATION TREATMENT: CPT

## 2017-07-23 PROCEDURE — 96375 TX/PRO/DX INJ NEW DRUG ADDON: CPT

## 2017-07-23 PROCEDURE — 87040 BLOOD CULTURE FOR BACTERIA: CPT

## 2017-07-23 PROCEDURE — 82947 ASSAY GLUCOSE BLOOD QUANT: CPT

## 2017-07-23 PROCEDURE — 84132 ASSAY OF SERUM POTASSIUM: CPT

## 2017-07-23 PROCEDURE — 84145 PROCALCITONIN (PCT): CPT

## 2017-11-21 ENCOUNTER — APPOINTMENT (OUTPATIENT)
Dept: NEUROLOGY | Facility: CLINIC | Age: 57
End: 2017-11-21
Payer: MEDICAID

## 2017-11-21 VITALS
SYSTOLIC BLOOD PRESSURE: 135 MMHG | DIASTOLIC BLOOD PRESSURE: 89 MMHG | HEART RATE: 73 BPM | WEIGHT: 225 LBS | HEIGHT: 71 IN | BODY MASS INDEX: 31.5 KG/M2

## 2017-11-21 DIAGNOSIS — Z87.438 PERSONAL HISTORY OF OTHER DISEASES OF MALE GENITAL ORGANS: ICD-10-CM

## 2017-11-21 DIAGNOSIS — Z78.9 OTHER SPECIFIED HEALTH STATUS: ICD-10-CM

## 2017-11-21 DIAGNOSIS — Z87.891 PERSONAL HISTORY OF NICOTINE DEPENDENCE: ICD-10-CM

## 2017-11-21 DIAGNOSIS — Z82.61 FAMILY HISTORY OF ARTHRITIS: ICD-10-CM

## 2017-11-21 DIAGNOSIS — J45.20 MILD INTERMITTENT ASTHMA, UNCOMPLICATED: ICD-10-CM

## 2017-11-21 PROCEDURE — 99203 OFFICE O/P NEW LOW 30 MIN: CPT

## 2017-11-21 RX ORDER — TAMSULOSIN HYDROCHLORIDE 0.4 MG/1
0.4 CAPSULE ORAL
Refills: 0 | Status: ACTIVE | COMMUNITY

## 2017-11-21 RX ORDER — GABAPENTIN 100 MG/1
100 CAPSULE ORAL 3 TIMES DAILY
Qty: 90 | Refills: 3 | Status: ACTIVE | COMMUNITY
Start: 2017-11-21 | End: 1900-01-01

## 2017-11-21 RX ORDER — FINASTERIDE 5 MG/1
5 TABLET, FILM COATED ORAL
Refills: 0 | Status: ACTIVE | COMMUNITY

## 2017-11-21 RX ORDER — ALBUTEROL SULFATE 90 UG/1
108 (90 BASE) AEROSOL, METERED RESPIRATORY (INHALATION)
Refills: 0 | Status: ACTIVE | COMMUNITY

## 2018-01-24 ENCOUNTER — APPOINTMENT (OUTPATIENT)
Dept: NEUROLOGY | Facility: CLINIC | Age: 58
End: 2018-01-24
Payer: MEDICAID

## 2018-01-24 PROCEDURE — 95886 MUSC TEST DONE W/N TEST COMP: CPT

## 2018-01-24 PROCEDURE — 95907 NVR CNDJ TST 1-2 STUDIES: CPT

## 2018-02-02 ENCOUNTER — OTHER (OUTPATIENT)
Age: 58
End: 2018-02-02

## 2018-02-15 ENCOUNTER — OTHER (OUTPATIENT)
Age: 58
End: 2018-02-15

## 2018-02-20 ENCOUNTER — FORM ENCOUNTER (OUTPATIENT)
Age: 58
End: 2018-02-20

## 2018-02-21 ENCOUNTER — APPOINTMENT (OUTPATIENT)
Dept: NEUROLOGY | Facility: CLINIC | Age: 58
End: 2018-02-21

## 2018-02-21 ENCOUNTER — APPOINTMENT (OUTPATIENT)
Dept: MRI IMAGING | Facility: CLINIC | Age: 58
End: 2018-02-21
Payer: MEDICAID

## 2018-02-21 ENCOUNTER — OUTPATIENT (OUTPATIENT)
Dept: OUTPATIENT SERVICES | Facility: HOSPITAL | Age: 58
LOS: 1 days | End: 2018-02-21
Payer: MEDICAID

## 2018-02-21 ENCOUNTER — APPOINTMENT (OUTPATIENT)
Dept: MRI IMAGING | Facility: CLINIC | Age: 58
End: 2018-02-21

## 2018-02-21 VITALS
HEIGHT: 71 IN | SYSTOLIC BLOOD PRESSURE: 122 MMHG | DIASTOLIC BLOOD PRESSURE: 83 MMHG | HEART RATE: 100 BPM | BODY MASS INDEX: 31.92 KG/M2 | WEIGHT: 228 LBS

## 2018-02-21 DIAGNOSIS — Z00.8 ENCOUNTER FOR OTHER GENERAL EXAMINATION: ICD-10-CM

## 2018-02-21 PROCEDURE — 73221 MRI JOINT UPR EXTREM W/O DYE: CPT

## 2018-02-21 PROCEDURE — 73221 MRI JOINT UPR EXTREM W/O DYE: CPT | Mod: 26,LT

## 2018-04-25 ENCOUNTER — APPOINTMENT (OUTPATIENT)
Dept: NEUROLOGY | Facility: CLINIC | Age: 58
End: 2018-04-25
Payer: MEDICAID

## 2018-04-25 VITALS
HEART RATE: 68 BPM | WEIGHT: 228 LBS | HEIGHT: 71 IN | SYSTOLIC BLOOD PRESSURE: 152 MMHG | BODY MASS INDEX: 31.92 KG/M2 | DIASTOLIC BLOOD PRESSURE: 97 MMHG

## 2018-04-25 DIAGNOSIS — G56.23 LESION OF ULNAR NERVE, BILATERAL UPPER LIMBS: ICD-10-CM

## 2018-04-25 PROCEDURE — 99214 OFFICE O/P EST MOD 30 MIN: CPT

## 2018-05-07 ENCOUNTER — FORM ENCOUNTER (OUTPATIENT)
Age: 58
End: 2018-05-07

## 2018-05-08 ENCOUNTER — APPOINTMENT (OUTPATIENT)
Dept: MRI IMAGING | Facility: CLINIC | Age: 58
End: 2018-05-08
Payer: MEDICAID

## 2018-05-08 ENCOUNTER — OUTPATIENT (OUTPATIENT)
Dept: OUTPATIENT SERVICES | Facility: HOSPITAL | Age: 58
LOS: 1 days | End: 2018-05-08
Payer: MEDICAID

## 2018-05-08 DIAGNOSIS — Z00.8 ENCOUNTER FOR OTHER GENERAL EXAMINATION: ICD-10-CM

## 2018-05-08 PROCEDURE — 72141 MRI NECK SPINE W/O DYE: CPT | Mod: 26

## 2018-05-08 PROCEDURE — 72141 MRI NECK SPINE W/O DYE: CPT

## 2018-06-21 ENCOUNTER — APPOINTMENT (OUTPATIENT)
Dept: NEUROLOGY | Facility: CLINIC | Age: 58
End: 2018-06-21

## 2018-07-11 ENCOUNTER — APPOINTMENT (OUTPATIENT)
Dept: ORTHOPEDIC SURGERY | Facility: CLINIC | Age: 58
End: 2018-07-11
Payer: MEDICAID

## 2018-07-11 VITALS
BODY MASS INDEX: 30.1 KG/M2 | HEART RATE: 67 BPM | HEIGHT: 71 IN | WEIGHT: 215 LBS | DIASTOLIC BLOOD PRESSURE: 85 MMHG | SYSTOLIC BLOOD PRESSURE: 127 MMHG

## 2018-07-11 PROCEDURE — 99203 OFFICE O/P NEW LOW 30 MIN: CPT

## 2018-11-05 ENCOUNTER — OUTPATIENT (OUTPATIENT)
Dept: OUTPATIENT SERVICES | Facility: HOSPITAL | Age: 58
LOS: 1 days | End: 2018-11-05
Payer: MEDICAID

## 2018-11-05 ENCOUNTER — APPOINTMENT (OUTPATIENT)
Dept: RADIOLOGY | Facility: CLINIC | Age: 58
End: 2018-11-05
Payer: MEDICAID

## 2018-11-05 DIAGNOSIS — Z00.8 ENCOUNTER FOR OTHER GENERAL EXAMINATION: ICD-10-CM

## 2018-11-05 PROBLEM — E78.00 PURE HYPERCHOLESTEROLEMIA, UNSPECIFIED: Chronic | Status: ACTIVE | Noted: 2017-04-14

## 2018-11-05 PROBLEM — J45.909 UNSPECIFIED ASTHMA, UNCOMPLICATED: Chronic | Status: ACTIVE | Noted: 2017-04-14

## 2018-11-05 PROCEDURE — 73030 X-RAY EXAM OF SHOULDER: CPT | Mod: 26,RT

## 2018-11-05 PROCEDURE — 73562 X-RAY EXAM OF KNEE 3: CPT

## 2018-11-05 PROCEDURE — 73562 X-RAY EXAM OF KNEE 3: CPT | Mod: 26,RT

## 2018-11-05 PROCEDURE — 73030 X-RAY EXAM OF SHOULDER: CPT

## 2018-11-09 ENCOUNTER — APPOINTMENT (OUTPATIENT)
Dept: ORTHOPEDIC SURGERY | Facility: CLINIC | Age: 58
End: 2018-11-09
Payer: MEDICAID

## 2018-11-09 DIAGNOSIS — M47.10 OTHER SPONDYLOSIS WITH MYELOPATHY, SITE UNSPECIFIED: ICD-10-CM

## 2018-11-09 DIAGNOSIS — M54.12 RADICULOPATHY, CERVICAL REGION: ICD-10-CM

## 2018-11-09 DIAGNOSIS — M48.02 SPINAL STENOSIS, CERVICAL REGION: ICD-10-CM

## 2018-11-09 PROCEDURE — 99214 OFFICE O/P EST MOD 30 MIN: CPT

## 2018-12-12 PROBLEM — M48.02 CERVICAL STENOSIS OF SPINE: Status: ACTIVE | Noted: 2018-07-20

## 2018-12-12 PROBLEM — M54.12 CERVICAL RADICULOPATHY: Status: ACTIVE | Noted: 2018-05-11

## 2019-06-23 PROBLEM — M47.10 OTHER SPONDYLOSIS WITH MYELOPATHY: Status: ACTIVE | Noted: 2018-07-20

## 2020-01-22 ENCOUNTER — CHART COPY (OUTPATIENT)
Age: 60
End: 2020-01-22

## 2020-01-22 DIAGNOSIS — Z81.0 FAMILY HISTORY OF INTELLECTUAL DISABILITIES: ICD-10-CM

## 2020-07-30 LAB
MISCELLANEOUS TEST: NORMAL
PROC NAME: NORMAL

## 2024-03-26 ENCOUNTER — OFFICE (OUTPATIENT)
Dept: URBAN - METROPOLITAN AREA CLINIC 103 | Facility: CLINIC | Age: 64
Setting detail: OPHTHALMOLOGY
End: 2024-03-26
Payer: COMMERCIAL

## 2024-03-26 DIAGNOSIS — H25.13: ICD-10-CM

## 2024-03-26 DIAGNOSIS — H40.1132: ICD-10-CM

## 2024-03-26 PROCEDURE — 92002 INTRM OPH EXAM NEW PATIENT: CPT | Performed by: OPHTHALMOLOGY

## 2024-03-26 PROCEDURE — 92133 CPTRZD OPH DX IMG PST SGM ON: CPT | Performed by: OPHTHALMOLOGY

## 2024-03-26 ASSESSMENT — REFRACTION_MANIFEST
OS_ADD: +2.50
OS_AXIS: 075
OS_CYLINDER: -1.00
OD_ADD: +2.25
OS_SPHERE: +2.75
OS_CYLINDER: -0.75
OD_AXIS: 093
OD_ADD: +2.50
OS_ADD: +2.25
OD_SPHERE: +2.50
OU_VA: 20/20
OS_VA1: 20/20
OS_SPHERE: +2.75
OS_VA1: 20/20
OD_VA1: 20/20
OD_CYLINDER: -0.50
OS_AXIS: 078
OD_CYLINDER: SPHERE
OD_SPHERE: +2.00

## 2024-03-26 ASSESSMENT — REFRACTION_CURRENTRX
OS_AXIS: 065
OS_VPRISM_DIRECTION: BF
OD_VPRISM_DIRECTION: BF
OS_OVR_VA: 20/
OS_CYLINDER: -0.75
OS_OVR_VA: 20/
OS_OVR_VA: 20/
OS_ADD: +2.25
OD_OVR_VA: 20/
OD_ADD: +2.25
OD_ADD: +2.25
OD_SPHERE: +2.00
OD_CYLINDER: SPHERE
OS_SPHERE: +2.25
OD_CYLINDER: SPHERE
OD_OVR_VA: 20/
OS_ADD: +2.25
OD_VPRISM_DIRECTION: BF
OS_AXIS: 066
OS_CYLINDER: -0.75
OD_OVR_VA: 20/
OD_SPHERE: +2.00
OS_SPHERE: +2.75
OS_VPRISM_DIRECTION: BF

## 2024-03-26 ASSESSMENT — SPHEQUIV_DERIVED
OS_SPHEQUIV: 2.375
OD_SPHEQUIV: 2.25
OS_SPHEQUIV: 2.25

## 2024-12-04 ENCOUNTER — OFFICE (OUTPATIENT)
Dept: URBAN - METROPOLITAN AREA CLINIC 103 | Facility: CLINIC | Age: 64
Setting detail: OPHTHALMOLOGY
End: 2024-12-04
Payer: MEDICARE

## 2024-12-04 DIAGNOSIS — H16.223: ICD-10-CM

## 2024-12-04 DIAGNOSIS — H40.1132: ICD-10-CM

## 2024-12-04 DIAGNOSIS — H25.011: ICD-10-CM

## 2024-12-04 DIAGNOSIS — H25.043: ICD-10-CM

## 2024-12-04 DIAGNOSIS — H25.13: ICD-10-CM

## 2024-12-04 PROCEDURE — 92014 COMPRE OPH EXAM EST PT 1/>: CPT | Performed by: OPHTHALMOLOGY

## 2024-12-04 PROCEDURE — 92083 EXTENDED VISUAL FIELD XM: CPT | Performed by: OPHTHALMOLOGY

## 2024-12-04 PROCEDURE — 92250 FUNDUS PHOTOGRAPHY W/I&R: CPT | Performed by: OPHTHALMOLOGY

## 2024-12-04 ASSESSMENT — KERATOMETRY
OS_AXISANGLE_DEGREES: 163
OD_K2POWER_DIOPTERS: 43.00
OD_AXISANGLE_DEGREES: 021
OS_K2POWER_DIOPTERS: 42.50
OD_K1POWER_DIOPTERS: 42.50
OS_K1POWER_DIOPTERS: 42.25

## 2024-12-04 ASSESSMENT — REFRACTION_CURRENTRX
OS_VPRISM_DIRECTION: BF
OS_VPRISM_DIRECTION: BF
OD_OVR_VA: 20/
OS_ADD: +2.25
OD_ADD: +2.25
OS_CYLINDER: -0.75
OS_OVR_VA: 20/
OD_CYLINDER: SPHERE
OS_CYLINDER: -0.75
OS_AXIS: 065
OS_OVR_VA: 20/
OD_SPHERE: +2.00
OS_SPHERE: +2.50
OD_SPHERE: +2.25
OD_AXIS: 117
OD_SPHERE: +2.00
OD_ADD: +2.50
OS_OVR_VA: 20/
OD_CYLINDER: SPHERE
OS_AXIS: 074
OD_OVR_VA: 20/
OS_CYLINDER: -0.75
OD_VPRISM_DIRECTION: BF
OD_ADD: +2.25
OD_CYLINDER: -0.50
OS_ADD: +2.25
OS_SPHERE: +2.75
OS_ADD: +2.50
OD_VPRISM_DIRECTION: BF
OS_SPHERE: +2.25
OS_AXIS: 066
OS_VPRISM_DIRECTION: BF
OD_VPRISM_DIRECTION: BF
OD_OVR_VA: 20/

## 2024-12-04 ASSESSMENT — REFRACTION_MANIFEST
OS_ADD: +2.25
OD_ADD: +2.50
OD_ADD: +2.25
OD_SPHERE: +2.50
OS_VA1: 20/20
OD_VA1: 20/20
OS_VA1: 20/20
OD_CYLINDER: SPHERE
OD_CYLINDER: -0.50
OS_SPHERE: +2.75
OU_VA: 20/20
OS_SPHERE: +2.75
OS_CYLINDER: -0.75
OS_AXIS: 078
OD_AXIS: 093
OS_CYLINDER: -1.00
OS_AXIS: 075
OS_ADD: +2.50
OD_SPHERE: +2.00

## 2024-12-04 ASSESSMENT — PACHYMETRY
OD_CT_UM: 638
OD_CT_CORRECTION: -6
OS_CT_CORRECTION: -7
OS_CT_UM: 644

## 2024-12-04 ASSESSMENT — REFRACTION_AUTOREFRACTION
OD_CYLINDER: -1.00
OS_AXIS: 098
OS_SPHERE: +1.75
OS_CYLINDER: -1.00
OD_AXIS: 097
OD_SPHERE: +2.50

## 2024-12-04 ASSESSMENT — SUPERFICIAL PUNCTATE KERATITIS (SPK)
OD_SPK: T
OS_SPK: T

## 2024-12-04 ASSESSMENT — CONFRONTATIONAL VISUAL FIELD TEST (CVF)
OD_FINDINGS: FULL
OS_FINDINGS: FULL

## 2024-12-04 ASSESSMENT — VISUAL ACUITY
OS_BCVA: 20/20
OD_BCVA: 20/25

## 2025-04-01 ENCOUNTER — OFFICE (OUTPATIENT)
Dept: URBAN - METROPOLITAN AREA CLINIC 103 | Facility: CLINIC | Age: 65
Setting detail: OPHTHALMOLOGY
End: 2025-04-01
Payer: MEDICARE

## 2025-04-01 DIAGNOSIS — H40.1122: ICD-10-CM

## 2025-04-01 PROCEDURE — 65855 TRABECULOPLASTY LASER SURG: CPT | Mod: LT | Performed by: OPHTHALMOLOGY

## 2025-04-01 ASSESSMENT — REFRACTION_MANIFEST
OS_VA1: 20/20
OD_CYLINDER: -0.50
OD_ADD: +2.25
OU_VA: 20/20
OD_AXIS: 093
OD_SPHERE: +2.50
OS_VA1: 20/20
OS_ADD: +2.25
OD_ADD: +2.50
OS_SPHERE: +2.75
OD_VA1: 20/20
OS_SPHERE: +2.75
OS_CYLINDER: -0.75
OS_AXIS: 075
OS_ADD: +2.50
OS_AXIS: 078
OD_CYLINDER: SPHERE
OD_SPHERE: +2.00
OS_CYLINDER: -1.00

## 2025-04-01 ASSESSMENT — KERATOMETRY
OD_K2POWER_DIOPTERS: 43.00
OD_K1POWER_DIOPTERS: 42.50
OS_K1POWER_DIOPTERS: 42.25
OD_AXISANGLE_DEGREES: 021
OS_K2POWER_DIOPTERS: 42.50
OS_AXISANGLE_DEGREES: 163

## 2025-04-01 ASSESSMENT — VISUAL ACUITY
OS_BCVA: 20/20
OD_BCVA: 20/25

## 2025-04-01 ASSESSMENT — REFRACTION_CURRENTRX
OS_OVR_VA: 20/
OS_VPRISM_DIRECTION: BF
OD_SPHERE: +2.00
OS_AXIS: 074
OD_OVR_VA: 20/
OS_CYLINDER: -0.75
OS_ADD: +2.25
OS_AXIS: 066
OD_VPRISM_DIRECTION: BF
OS_CYLINDER: -0.75
OD_ADD: +2.50
OD_CYLINDER: SPHERE
OD_CYLINDER: -0.50
OS_CYLINDER: -0.75
OS_OVR_VA: 20/
OS_SPHERE: +2.50
OS_SPHERE: +2.75
OS_VPRISM_DIRECTION: BF
OD_VPRISM_DIRECTION: BF
OS_VPRISM_DIRECTION: BF
OD_ADD: +2.25
OD_SPHERE: +2.25
OD_AXIS: 117
OS_ADD: +2.25
OD_ADD: +2.25
OD_OVR_VA: 20/
OD_VPRISM_DIRECTION: BF
OD_CYLINDER: SPHERE
OS_SPHERE: +2.25
OS_ADD: +2.50
OS_OVR_VA: 20/
OS_AXIS: 065
OD_OVR_VA: 20/
OD_SPHERE: +2.00

## 2025-04-01 ASSESSMENT — REFRACTION_AUTOREFRACTION
OD_AXIS: 097
OS_CYLINDER: -1.00
OS_AXIS: 098
OS_SPHERE: +1.75
OD_SPHERE: +2.50
OD_CYLINDER: -1.00

## 2025-04-04 ENCOUNTER — OFFICE (OUTPATIENT)
Dept: URBAN - METROPOLITAN AREA CLINIC 103 | Facility: CLINIC | Age: 65
Setting detail: OPHTHALMOLOGY
End: 2025-04-04
Payer: MEDICARE

## 2025-04-04 DIAGNOSIS — H40.1112: ICD-10-CM

## 2025-04-04 PROBLEM — H40.1122 PRIMARY OPEN ANGLE GLAUCOMA; RIGHT EYE MODERATE, LEFT EYE MODERATE: Status: ACTIVE | Noted: 2025-04-01

## 2025-04-04 PROCEDURE — 65855 TRABECULOPLASTY LASER SURG: CPT | Mod: 79,RT | Performed by: OPHTHALMOLOGY

## 2025-04-04 ASSESSMENT — REFRACTION_CURRENTRX
OD_SPHERE: +2.00
OS_CYLINDER: -0.75
OS_AXIS: 065
OS_ADD: +2.25
OS_SPHERE: +2.75
OD_VPRISM_DIRECTION: BF
OS_SPHERE: +2.25
OD_CYLINDER: SPHERE
OD_VPRISM_DIRECTION: BF
OS_VPRISM_DIRECTION: BF
OS_CYLINDER: -0.75
OD_OVR_VA: 20/
OS_ADD: +2.50
OD_SPHERE: +2.00
OS_VPRISM_DIRECTION: BF
OS_OVR_VA: 20/
OD_ADD: +2.25
OD_OVR_VA: 20/
OS_OVR_VA: 20/
OD_VPRISM_DIRECTION: BF
OS_OVR_VA: 20/
OD_ADD: +2.50
OS_AXIS: 066
OS_AXIS: 074
OD_CYLINDER: SPHERE
OS_VPRISM_DIRECTION: BF
OD_AXIS: 117
OS_SPHERE: +2.50
OD_OVR_VA: 20/
OS_ADD: +2.25
OD_ADD: +2.25
OS_CYLINDER: -0.75
OD_SPHERE: +2.25
OD_CYLINDER: -0.50

## 2025-04-04 ASSESSMENT — KERATOMETRY
OS_K1POWER_DIOPTERS: 42.25
OS_AXISANGLE_DEGREES: 163
OD_AXISANGLE_DEGREES: 021
OD_K2POWER_DIOPTERS: 43.00
OD_K1POWER_DIOPTERS: 42.50
OS_K2POWER_DIOPTERS: 42.50

## 2025-04-04 ASSESSMENT — REFRACTION_MANIFEST
OD_CYLINDER: SPHERE
OS_ADD: +2.25
OD_SPHERE: +2.00
OU_VA: 20/20
OD_AXIS: 093
OS_SPHERE: +2.75
OD_CYLINDER: -0.50
OS_AXIS: 075
OD_ADD: +2.50
OS_ADD: +2.50
OD_VA1: 20/20
OS_VA1: 20/20
OS_AXIS: 078
OS_CYLINDER: -0.75
OD_SPHERE: +2.50
OS_VA1: 20/20
OS_SPHERE: +2.75
OS_CYLINDER: -1.00
OD_ADD: +2.25

## 2025-04-04 ASSESSMENT — REFRACTION_AUTOREFRACTION
OD_SPHERE: +2.50
OS_SPHERE: +1.75
OS_AXIS: 098
OD_AXIS: 097
OS_CYLINDER: -1.00
OD_CYLINDER: -1.00

## 2025-04-04 ASSESSMENT — VISUAL ACUITY
OD_BCVA: 20/25
OS_BCVA: 20/20

## 2025-04-04 ASSESSMENT — SUPERFICIAL PUNCTATE KERATITIS (SPK)
OD_SPK: T
OS_SPK: T

## 2025-05-08 ENCOUNTER — OFFICE (OUTPATIENT)
Dept: URBAN - METROPOLITAN AREA CLINIC 103 | Facility: CLINIC | Age: 65
Setting detail: OPHTHALMOLOGY
End: 2025-05-08
Payer: MEDICARE

## 2025-05-08 DIAGNOSIS — H25.13: ICD-10-CM

## 2025-05-08 DIAGNOSIS — H25.043: ICD-10-CM

## 2025-05-08 DIAGNOSIS — H16.223: ICD-10-CM

## 2025-05-08 DIAGNOSIS — H40.1132: ICD-10-CM

## 2025-05-08 DIAGNOSIS — H25.011: ICD-10-CM

## 2025-05-08 PROCEDURE — 92133 CPTRZD OPH DX IMG PST SGM ON: CPT | Performed by: OPHTHALMOLOGY

## 2025-05-08 PROCEDURE — 92012 INTRM OPH EXAM EST PATIENT: CPT | Performed by: OPHTHALMOLOGY

## 2025-05-08 ASSESSMENT — REFRACTION_AUTOREFRACTION
OD_SPHERE: +2.50
OS_SPHERE: +2.25
OS_AXIS: 093
OD_CYLINDER: -1.00
OD_AXIS: 093
OS_CYLINDER: -1.50

## 2025-05-08 ASSESSMENT — REFRACTION_MANIFEST
OD_CYLINDER: SPHERE
OS_CYLINDER: -0.75
OU_VA: 20/20
OD_ADD: +2.50
OS_AXIS: 075
OD_VA1: 20/20
OS_CYLINDER: -1.00
OD_SPHERE: +2.50
OS_SPHERE: +2.75
OD_AXIS: 093
OS_SPHERE: +2.75
OD_SPHERE: +2.00
OS_ADD: +2.50
OS_ADD: +2.25
OD_ADD: +2.25
OS_VA1: 20/20
OD_CYLINDER: -0.50
OS_VA1: 20/20
OS_AXIS: 078

## 2025-05-08 ASSESSMENT — KERATOMETRY
OD_AXISANGLE_DEGREES: 018
OS_AXISANGLE_DEGREES: 165
OS_K2POWER_DIOPTERS: 42.25
OD_K1POWER_DIOPTERS: 42.50
OS_K1POWER_DIOPTERS: 41.75
OD_K2POWER_DIOPTERS: 43.00

## 2025-05-08 ASSESSMENT — REFRACTION_CURRENTRX
OD_VPRISM_DIRECTION: BF
OD_CYLINDER: SPHERE
OD_VPRISM_DIRECTION: BF
OS_ADD: +2.25
OD_VPRISM_DIRECTION: BF
OD_CYLINDER: SPHERE
OS_SPHERE: +2.75
OS_ADD: +2.25
OS_CYLINDER: -0.50
OS_VPRISM_DIRECTION: BF
OD_SPHERE: +2.00
OS_SPHERE: +2.25
OS_SPHERE: ++2.25
OS_CYLINDER: -0.75
OD_ADD: +2.25
OS_OVR_VA: 20/
OS_VPRISM_DIRECTION: BF
OD_OVR_VA: 20/
OS_ADD: +2.50
OD_ADD: +2.50
OS_AXIS: 070
OD_SPHERE: +2.25
OS_CYLINDER: -0.75
OD_SPHERE: +2.00
OS_AXIS: 066
OD_CYLINDER: -0.25
OD_AXIS: 091
OS_AXIS: 065
OS_OVR_VA: 20/
OS_OVR_VA: 20/
OD_OVR_VA: 20/
OS_VPRISM_DIRECTION: BF
OD_OVR_VA: 20/
OD_ADD: +2.25

## 2025-05-08 ASSESSMENT — PACHYMETRY
OD_CT_UM: 638
OD_CT_CORRECTION: -6
OS_CT_CORRECTION: -7
OS_CT_UM: 644

## 2025-05-08 ASSESSMENT — TONOMETRY: OS_IOP_MMHG: 21

## 2025-05-08 ASSESSMENT — VISUAL ACUITY
OD_BCVA: 20/20-2
OS_BCVA: 20/20

## 2025-05-08 ASSESSMENT — SUPERFICIAL PUNCTATE KERATITIS (SPK)
OS_SPK: T
OD_SPK: T